# Patient Record
Sex: MALE | Race: WHITE | HISPANIC OR LATINO | URBAN - METROPOLITAN AREA
[De-identification: names, ages, dates, MRNs, and addresses within clinical notes are randomized per-mention and may not be internally consistent; named-entity substitution may affect disease eponyms.]

---

## 2018-09-02 ENCOUNTER — EMERGENCY (EMERGENCY)
Facility: HOSPITAL | Age: 43
LOS: 1 days | Discharge: HOME | End: 2018-09-02
Attending: EMERGENCY MEDICINE
Payer: COMMERCIAL

## 2018-09-02 VITALS
DIASTOLIC BLOOD PRESSURE: 90 MMHG | HEIGHT: 70 IN | HEART RATE: 93 BPM | WEIGHT: 214.07 LBS | RESPIRATION RATE: 18 BRPM | TEMPERATURE: 98 F | SYSTOLIC BLOOD PRESSURE: 148 MMHG | OXYGEN SATURATION: 98 %

## 2018-09-02 PROCEDURE — 93005 ELECTROCARDIOGRAM TRACING: CPT

## 2018-09-02 PROCEDURE — 71046 X-RAY EXAM CHEST 2 VIEWS: CPT

## 2018-09-02 PROCEDURE — 99284 EMERGENCY DEPT VISIT MOD MDM: CPT | Mod: 25

## 2018-09-02 PROCEDURE — 99283 EMERGENCY DEPT VISIT LOW MDM: CPT | Mod: 25

## 2018-09-02 NOTE — ED PROVIDER NOTE - OBJECTIVE STATEMENT
42 year old M Pt w/ no PMHx presents to ED c/o chest pain today. Pt reports pain when sneezing and slightly with deep inspiration. Pt denies lower extremity edema, recent travel and all other complaints. NKDA 42 year old M Pt w/ no PMHx presents to ED c/o chest pain today. Pt reports pain when sneezing and slightly with deep inspiration. Pt denies fever, cough, ap, n/v/d, diaphoresis lower extremity edema, recent travel, change with exertion, and all other complaints. NKDA

## 2018-09-02 NOTE — ED PROVIDER NOTE - MEDICAL DECISION MAKING DETAILS
42 year old M Pt w/ chest pain when sneezing, normal EKG and chest X-Ray, PERC is zero, follow up with PMD in next 2 days, return to ED for new or returning symptoms 42 year old M Pt w/ chest pain when sneezing, normal EKG and chest X-Ray, PERC is zero, history not concerning for ACS. follow up with PMD in next 1-2 days, Return to the ED immediately if getting worse, not improving, or if having any new or troubling symptoms.

## 2018-09-03 PROCEDURE — 71046 X-RAY EXAM CHEST 2 VIEWS: CPT | Mod: 26

## 2018-09-03 NOTE — ED ADULT NURSE NOTE - NSIMPLEMENTINTERV_GEN_ALL_ED
Implemented All Universal Safety Interventions:  [place] to call system. Call bell, personal items and telephone within reach. Instruct patient to call for assistance. Room bathroom lighting operational. Non-slip footwear when patient is off stretcher. Physically safe environment: no spills, clutter or unnecessary equipment. Stretcher in lowest position, wheels locked, appropriate side rails in place.

## 2018-09-03 NOTE — ED ADULT NURSE NOTE - CAS DISCH ACCOMP BY
How Severe Is Your Skin Lesion?: mild
Has Your Skin Lesion Been Treated?: not been treated
Is This A New Presentation, Or A Follow-Up?: Skin Lesion
Self

## 2021-12-29 ENCOUNTER — OUTPATIENT (OUTPATIENT)
Dept: OUTPATIENT SERVICES | Facility: HOSPITAL | Age: 46
LOS: 1 days | End: 2021-12-29

## 2021-12-29 DIAGNOSIS — B34.9 VIRAL INFECTION, UNSPECIFIED: ICD-10-CM
